# Patient Record
Sex: MALE | Race: WHITE | NOT HISPANIC OR LATINO | URBAN - METROPOLITAN AREA
[De-identification: names, ages, dates, MRNs, and addresses within clinical notes are randomized per-mention and may not be internally consistent; named-entity substitution may affect disease eponyms.]

---

## 2021-09-08 ENCOUNTER — EMERGENCY (EMERGENCY)
Facility: HOSPITAL | Age: 56
LOS: 1 days | Discharge: HOME | End: 2021-09-08
Attending: STUDENT IN AN ORGANIZED HEALTH CARE EDUCATION/TRAINING PROGRAM
Payer: COMMERCIAL

## 2021-09-08 VITALS
SYSTOLIC BLOOD PRESSURE: 146 MMHG | RESPIRATION RATE: 18 BRPM | HEART RATE: 85 BPM | OXYGEN SATURATION: 98 % | WEIGHT: 175.05 LBS | TEMPERATURE: 99 F | DIASTOLIC BLOOD PRESSURE: 96 MMHG | HEIGHT: 70 IN

## 2021-09-08 PROCEDURE — 65222 REMOVE FOREIGN BODY FROM EYE: CPT

## 2021-09-08 PROCEDURE — 99283 EMERGENCY DEPT VISIT LOW MDM: CPT | Mod: 25

## 2021-09-08 PROCEDURE — 99283 EMERGENCY DEPT VISIT LOW MDM: CPT

## 2021-09-08 RX ORDER — OFLOXACIN 0.3 %
2 DROPS OPHTHALMIC (EYE)
Qty: 40 | Refills: 0
Start: 2021-09-08 | End: 2021-09-12

## 2021-09-08 NOTE — ED PROVIDER NOTE - ATTENDING CONTRIBUTION TO CARE
I, Martir Álvarez, performed a history and physical exam of the patient and discussed their management with the resident and/or advanced care provider. I reviewed the resident and/or advanced care provider's note and agree with the documented findings and plan of care. I was present and available for all procedures.    55yo m no pmhx pw right eye pain after driving on highway and feeling something fly into right eye. denies vision changes but reports pain in right eye. no sx earlier today no corrective lenses or glasses.     Gen: Well appearing and in NAD  Head: normal appearing atraumatic   1. Visual acuity intact  2. Pupils without anisocoria, has normal reactivity,   3. Extraocular motility and alignment intact,   4. Intraocular pressure- tonometry not measured,   5. Confrontation visual fields intact  6. External examination, normal  7. Lids/lashes/lacrimal system: Normal anatomy and contours     Conjunctiva/sclera: Injection     Iris: Round pupil        fluorecin stain to be completed  Neck: trachea midline  Resp:  No respiratory distress  Ext: no visible deformities  Neuro:  Alert and oriented, appears non focal  Skin:  Warm and dry as visualized  Psych:  Normal affect and mood      well appearing possible corneal abrasion 2/2 retained small organic material in right eye will sundar eyelid remove fb stain r/o corneal injury optho Follow up

## 2021-09-08 NOTE — ED PROVIDER NOTE - CLINICAL SUMMARY MEDICAL DECISION MAKING FREE TEXT BOX
see attest see attest    Calcagni: 56yM w/ eye pain, FB removed, patient had relief of symptoms immediately afterward  corneal abrasion identified, will DC with ophtho follow up and abx drops    Will discharge. Discussed the case with patient and/or family.  Instructed urgent follow up with primary care doctor for review of their ED visit (labs, radiology, etc.) Also instructed follow up for any specialty services as needed. Patient and/or family are aware to return to ED with any new or worsening symptoms. All questions were answered and the opportunity for to ask questions were given. Patient and/or family verbalized understanding of the above instructions.

## 2021-09-08 NOTE — ED PROVIDER NOTE - NSFOLLOWUPINSTRUCTIONS_ED_ALL_ED_FT
Please follow up with your primary care doctor as soon as possible for review of your ED visit.   Please return to the ED if you develop any new or worsening symptoms.  Please follow with ophthalmology within the week, return if any new or worsening symptoms.

## 2021-09-08 NOTE — ED ADULT NURSE NOTE - OBJECTIVE STATEMENT
55 YO male with no stated PMH  via walk in presenting with complaints of eye pain. As per patient, while sitting in his car, he felt like something hit his left eye. Pt endorses sudden eye pain and scratching sensation when he blinks. Pt endorses mild blurry vision and pain to the left eye with movement. Pt denies chest pain, palpitations, shortness of breath, headache,  numbness/tingling, fever, chills, diaphoresis,  nausea, vomiting, constipation, diarrhea, or urinary symptoms.   Pt Axox4, gross neuro intact, PERRL. Lungs clear throughout bilateral, respirations even, & non-labored. S1S2 heard, pulses strong and equal bilaterally. Abdomen soft, non-tender, non-distended. Skin warm, dry, and intact. Pt placed in position of comfort. Pt educated on call bell system and provided call bell. Bed in lowest position, wheels locked, appropriate side rails raised. Pt denies needs at this time.

## 2021-09-08 NOTE — ED ADULT NURSE NOTE - CADM POA CENTRAL LINE
Chief Complaint   Patient presents with    Sore Throat     x 1 week. Treated [facility] last friday, negative for strep      1. Have you been to the ER, urgent care clinic since your last visit? Hospitalized since your last visit? No    2. Have you seen or consulted any other health care providers outside of the [address] [place] [address] since your last visit? Include any pap smears or colon screening.  Yes [facility] last Friday for sore throat No

## 2021-09-08 NOTE — ED PROVIDER NOTE - OBJECTIVE STATEMENT
56yM 56yM with no sig pmhx presents with L eye pain. Patient reports that he was sitting in his car when he felt something hit his L eye and had sudden pain and a scratching sensation every time he blinked so came to ED. He notes some mild blurry vision and pain with moving the eye any direction. Denies any other symptoms.

## 2021-09-08 NOTE — ED PROVIDER NOTE - PATIENT PORTAL LINK FT
You can access the FollowMyHealth Patient Portal offered by [facility] by registering at the following website: [email or web address]. By joining [facility]’s FollowMyHealth portal, you will also be able to view your health information using other applications (apps) compatible with our system.

## 2021-09-08 NOTE — ED PROVIDER NOTE - PHYSICAL EXAMINATION
I have reviewed the triage vitals signs.  Const: Well nourished and developed. Appears stated age. Awake, alert, in no acute distress.  Head: Normocephalic and atraumatic.  Eyes:   No conjunctival pallor, white sclera with mild injection  EOMI intact with pain  PERRL  on lid lift a FB was removed from the under-side of the upper lid  VA: 20/40 bilaterally   + fluorescein uptake to the L cornea    Ear, Nose, Throat: Normal appearing externally. No external throat swelling.  Neck: Ranging without difficulty.  Respiratory: No acute respiratory distress. Lungs CTAB.  Cardiovascular: Regular rate and rhythm.  Abdominal: Soft, nontender, nondistended. No rebound or guarding.  Genitourinary: Deferred.  Back: Normal on inspection.  Extremities: Distal perfusion intact in all 4 extremities. No LE pitting edema.  Neuro: The patient is alert, conversive, without gross deficits.  Skin: Warm, dry, intact.

## 2021-09-08 NOTE — ED PROVIDER NOTE - NS ED ROS FT
ROS:  GENERAL: No fever, no chills  EYES: + blurry vision, + eye pain   HEENT: no trouble swallowing, no trouble speaking  CARDIAC: no chest pain  PULMONARY: no cough, no shortness of breath  GI: no abdominal pain, no nausea, no vomiting, no diarrhea, no constipation  : No dysuria, no frequency, no change in appearance, or odor of urine  SKIN: no rashes  NEURO: no headache, no weakness  MSK: No joint pain

## 2023-06-07 NOTE — ED ADULT TRIAGE NOTE - NSWEIGHTCALCTOOLDRUG_GEN_A_CORE
used How Severe Are Your Bumps?: mild Have Your Bumps Been Treated?: not been treated Is This A New Presentation, Or A Follow-Up?: Bump